# Patient Record
Sex: MALE | Race: BLACK OR AFRICAN AMERICAN | NOT HISPANIC OR LATINO | Employment: FULL TIME | URBAN - METROPOLITAN AREA
[De-identification: names, ages, dates, MRNs, and addresses within clinical notes are randomized per-mention and may not be internally consistent; named-entity substitution may affect disease eponyms.]

---

## 2022-08-14 ENCOUNTER — APPOINTMENT (EMERGENCY)
Dept: RADIOLOGY | Facility: HOSPITAL | Age: 37
End: 2022-08-14
Payer: COMMERCIAL

## 2022-08-14 ENCOUNTER — HOSPITAL ENCOUNTER (EMERGENCY)
Facility: HOSPITAL | Age: 37
Discharge: HOME | End: 2022-08-14
Attending: EMERGENCY MEDICINE
Payer: COMMERCIAL

## 2022-08-14 VITALS
BODY MASS INDEX: 36.19 KG/M2 | OXYGEN SATURATION: 100 % | RESPIRATION RATE: 16 BRPM | TEMPERATURE: 98.1 F | HEART RATE: 85 BPM | WEIGHT: 282 LBS | SYSTOLIC BLOOD PRESSURE: 144 MMHG | HEIGHT: 74 IN | DIASTOLIC BLOOD PRESSURE: 99 MMHG

## 2022-08-14 DIAGNOSIS — M54.50 ACUTE LOW BACK PAIN: Primary | ICD-10-CM

## 2022-08-14 PROCEDURE — 99284 EMERGENCY DEPT VISIT MOD MDM: CPT | Performed by: EMERGENCY MEDICINE

## 2022-08-14 PROCEDURE — 72100 X-RAY EXAM L-S SPINE 2/3 VWS: CPT

## 2022-08-14 PROCEDURE — 99283 EMERGENCY DEPT VISIT LOW MDM: CPT

## 2022-08-14 RX ORDER — LIDOCAINE 50 MG/G
1 PATCH TOPICAL DAILY
Qty: 30 PATCH | Refills: 0 | Status: SHIPPED | OUTPATIENT
Start: 2022-08-14

## 2022-08-14 RX ORDER — DICLOFENAC SODIUM 75 MG/1
75 TABLET, DELAYED RELEASE ORAL 2 TIMES DAILY
Qty: 30 TABLET | Refills: 0 | Status: SHIPPED | OUTPATIENT
Start: 2022-08-14

## 2022-08-14 RX ORDER — ATORVASTATIN CALCIUM 10 MG/1
10 TABLET, FILM COATED ORAL DAILY
COMMUNITY

## 2022-08-14 RX ORDER — NAPROXEN 250 MG/1
500 TABLET ORAL ONCE
Status: COMPLETED | OUTPATIENT
Start: 2022-08-14 | End: 2022-08-14

## 2022-08-14 RX ORDER — LIDOCAINE 50 MG/G
1 PATCH TOPICAL ONCE
Status: DISCONTINUED | OUTPATIENT
Start: 2022-08-14 | End: 2022-08-14 | Stop reason: HOSPADM

## 2022-08-14 RX ORDER — LISINOPRIL 20 MG/1
20 TABLET ORAL DAILY
COMMUNITY

## 2022-08-14 RX ORDER — CYCLOBENZAPRINE HCL 10 MG
10 TABLET ORAL 2 TIMES DAILY PRN
Qty: 20 TABLET | Refills: 0 | Status: SHIPPED | OUTPATIENT
Start: 2022-08-14

## 2022-08-14 RX ORDER — ACETAMINOPHEN 325 MG/1
650 TABLET ORAL ONCE
Status: COMPLETED | OUTPATIENT
Start: 2022-08-14 | End: 2022-08-14

## 2022-08-14 RX ADMIN — LIDOCAINE 5% 1 PATCH: 700 PATCH TOPICAL at 07:18

## 2022-08-14 RX ADMIN — NAPROXEN 500 MG: 250 TABLET ORAL at 07:18

## 2022-08-14 RX ADMIN — ACETAMINOPHEN 650 MG: 325 TABLET ORAL at 07:18

## 2022-08-14 NOTE — ED PROVIDER NOTES
History  Chief Complaint   Patient presents with    Back Pain     Patient states he was at the store when his lower back began to hurt, unsure of what caused the pain to start but has lasted for 3 days now  HPI  29-year-old male presents with right low back pain that started 3 days ago when he was lifting boxes at a store  States that pain is worse with movement and better with rest   It is moderate and aching  He took Aleve with some improvement  No weakness, numbness, saddle anesthesia, fevers, IV drug use  Prior to Admission Medications   Prescriptions Last Dose Informant Patient Reported? Taking?   atorvastatin (LIPITOR) 10 mg tablet   Yes Yes   Sig: Take 10 mg by mouth daily   lisinopril (ZESTRIL) 20 mg tablet   Yes Yes   Sig: Take 20 mg by mouth daily   metFORMIN (GLUCOPHAGE) 1000 MG tablet   Yes Yes   Sig: Take 1,000 mg by mouth      Facility-Administered Medications: None       Past Medical History:   Diagnosis Date    [facility]        History reviewed  No pertinent surgical history  History reviewed  No pertinent family history  I have reviewed and agree with the history as documented  E-Cigarette/Vaping     E-Cigarette/Vaping Substances     Social History     Tobacco Use    Smoking status: Current Every Day Smoker     Packs/day: 0 25     Types: Cigarettes   Substance Use Topics    Drug use: Yes     Types: Marijuana       Review of Systems   Constitutional: Negative for chills and fever  HENT: Negative for dental problem and ear pain  Eyes: Negative for pain and redness  Respiratory: Negative for cough and shortness of breath  Cardiovascular: Negative for chest pain and palpitations  Gastrointestinal: Negative for abdominal pain and nausea  Endocrine: Negative for polydipsia and polyphagia  Genitourinary: Negative for dysuria and frequency  Musculoskeletal: Positive for back pain  Negative for arthralgias and joint swelling     Skin: Negative for color change and rash  Neurological: Negative for dizziness and headaches  Psychiatric/Behavioral: Negative for behavioral problems and confusion  All other systems reviewed and are negative  Physical Exam  Physical Exam  Vitals and nursing note reviewed  Constitutional:       General: He is not in acute distress  Appearance: He is well-developed  He is not diaphoretic  HENT:      Head: Atraumatic  Right Ear: External ear normal       Left Ear: External ear normal       Nose: Nose normal    Eyes:      Conjunctiva/sclera: Conjunctivae normal       Pupils: Pupils are equal, round, and reactive to light  Neck:      Vascular: No JVD  Cardiovascular:      Rate and Rhythm: Normal rate and regular rhythm  Heart sounds: Normal heart sounds  No murmur heard  Pulmonary:      Effort: Pulmonary effort is normal  No respiratory distress  Breath sounds: Normal breath sounds  No wheezing  Abdominal:      General: Bowel sounds are normal  There is no distension  Palpations: Abdomen is soft  Tenderness: There is no abdominal tenderness  Musculoskeletal:         General: Normal range of motion  Cervical back: Normal range of motion and neck supple  Comments: Right lumbar paraspinal tenderness to palpation, normal strength and sensation bilateral lower extremities, normal gait   Skin:     General: Skin is warm and dry  Capillary Refill: Capillary refill takes less than 2 seconds  Neurological:      Mental Status: He is alert and oriented to person, place, and time  Cranial Nerves: No cranial nerve deficit     Psychiatric:         Behavior: Behavior normal          Vital Signs  ED Triage Vitals [08/14/22 0703]   Temperature Pulse Respirations Blood Pressure SpO2   98 1 °F (36 7 °C) 85 16 144/99 98 %      Temp Source Heart Rate Source Patient Position - Orthostatic VS BP Location FiO2 (%)   Oral Monitor Sitting Right arm --      Pain Score       8           Vitals: 08/14/22 0703   BP: 144/99   Pulse: 85   Patient Position - Orthostatic VS: Sitting         Visual Acuity      ED Medications  Medications   lidocaine (LIDODERM) 5 % patch 1 patch (1 patch Topical Medication Applied 8/14/22 0718)   naproxen (NAPROSYN) tablet 500 mg (500 mg Oral Given 8/14/22 0718)   acetaminophen (TYLENOL) tablet 650 mg (650 mg Oral Given 8/14/22 1782)       Diagnostic Studies  Results Reviewed     None                 XR spine lumbar 2 or 3 views injury    (Results Pending)              Procedures  Procedures         ED Course                               SBIRT 20yo+    Flowsheet Row Most Recent Value   SBIRT (23 yo +)    In order to provide better care to our patients, we are screening all of our patients for alcohol and drug use  Would it be okay to ask you these screening questions? Yes Filed at: 08/14/2022 1348   Initial Alcohol Screen: US AUDIT-C     1  How often do you have a drink containing alcohol? 0 Filed at: 08/14/2022 0708   2  How many drinks containing alcohol do you have on a typical day you are drinking? 0 Filed at: 08/14/2022 0708   3a  Male UNDER 65: How often do you have five or more drinks on one occasion? 0 Filed at: 08/14/2022 0708   Audit-C Score 0 Filed at: 08/14/2022 8827   KALEN: How many times in the past year have you    Used an illegal drug or used a prescription medication for non-medical reasons? Never Filed at: 08/14/2022 0708                    MDM  29 yo M presents with low back pain  No red flags in history or exam to suggest cauda equina  XR no acute abnormality per my read  Discussed supportive care and referral to comprehensive spine    Disposition  Final diagnoses:   Acute low back pain     Time reflects when diagnosis was documented in both MDM as applicable and the Disposition within this note     Time User Action Codes Description Comment    8/14/2022  7:37 AM Rosalinda Habermann Add [C17 50] Acute low back pain       ED Disposition     ED Disposition Discharge    Condition   Stable    Date/Time   Sun Aug 14, 2022  7:37 AM    Comment   Meenu Payne  discharge to home/self care                 Follow-up Information    None         Discharge Medication List as of 8/14/2022  7:40 AM      START taking these medications    Details   cyclobenzaprine (FLEXERIL) 10 mg tablet Take 1 tablet (10 mg total) by mouth 2 (two) times a day as needed for muscle spasms, Starting Sun 8/14/2022, Normal      diclofenac (VOLTAREN) 75 mg EC tablet Take 1 tablet (75 mg total) by mouth 2 (two) times a day, Starting Sun 8/14/2022, Normal      lidocaine (LIDODERM) 5 % Apply 1 patch topically daily Remove & Discard patch within 12 hours or as directed by MD, Starting Sun 8/14/2022, Normal         CONTINUE these medications which have NOT CHANGED    Details   atorvastatin (LIPITOR) 10 mg tablet Take 10 mg by mouth daily, Historical Med      lisinopril (ZESTRIL) 20 mg tablet Take 20 mg by mouth daily, Historical Med      metFORMIN (GLUCOPHAGE) 1000 MG tablet Take 1,000 mg by mouth, Historical Med                 PDMP Review     None          ED Provider  Electronically Signed by           Keshawn Villalta MD  08/14/22 0146

## 2022-08-14 NOTE — Clinical Note
Dioni Beal was seen and treated in our emergency department on 8/14/2022  Diagnosis: back pain    Monica Axon  may return to work on return date  He may return on this date: 08/17/2022         If you have any questions or concerns, please don't hesitate to call        Alba Velázquez MD    ______________________________           _______________          _______________  Hospital Representative                              Date                                Time

## 2022-08-14 NOTE — DISCHARGE INSTRUCTIONS
1  Rx of Voltaren, for the stronger pains  2  Rx of Flexeril, for pain and spasms  3  Rest your back for a day or two to begin healing  Use a firm mattress or the floor  Have your lower back firmly supported with a small pillow or towel  Keep your knees slightly bent, with another pillow under them  Every few hours, get up and walk as much as you can  4  Cold reduces swelling  Both cold and heat can reduce pain  Protect your skin by placing a towel between your body and the ice or heat source  For the first few days, apply an ice pack for 10-15 minutes every hour while youre awake  After the first few days, try heat to ease pain  5  Over-the-counter medications can help control pain and swelling, such as Tylenol     6  Exercise can help your back heal  It also helps your back get stronger and more flexible, preventing any reinjury  Also, use Good Posture to Avoid Re-injury  When moving, bend at the hips and knees  Dont bend at the waist or twist around  When lifting, keep the object close to your body  Dont try to lift more than you can handle  When sitting, keep your lower back supported  Use a rolled-up towel as needed  7  If you smoke, STOP! Smokers have diminished oxygen levels in their spinal tissues, which can hinder the healing process  8  Please make an appointment with your family doctor in one week for follow up if not starting to improve

## 2022-08-15 ENCOUNTER — TELEPHONE (OUTPATIENT)
Dept: PHYSICAL THERAPY | Facility: OTHER | Age: 37
End: 2022-08-15

## 2022-11-06 ENCOUNTER — HOSPITAL ENCOUNTER (EMERGENCY)
Facility: HOSPITAL | Age: 37
Discharge: HOME | End: 2022-11-06
Attending: EMERGENCY MEDICINE

## 2022-11-06 VITALS
RESPIRATION RATE: 17 BRPM | DIASTOLIC BLOOD PRESSURE: 95 MMHG | OXYGEN SATURATION: 99 % | HEART RATE: 66 BPM | BODY MASS INDEX: 33.62 KG/M2 | TEMPERATURE: 97.8 F | WEIGHT: 262 LBS | SYSTOLIC BLOOD PRESSURE: 146 MMHG | HEIGHT: 74 IN

## 2022-11-06 DIAGNOSIS — S09.90XA INJURY OF HEAD, INITIAL ENCOUNTER: Primary | ICD-10-CM
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
## 2022-11-06 NOTE — Clinical Note
Maria M Wendichris was seen and treated in our emergency department on 11/6/2022  Diagnosis:     Radha Menchaca  may return to work on return date  He may return on this date: 11/08/2022         If you have any questions or concerns, please don't hesitate to call        Babar Hernández MD    ______________________________           _______________          _______________  Hospital Representative                              Date                                Time

## 2022-11-06 NOTE — DISCHARGE INSTRUCTIONS
Normal care  Tylenol if needed for pain  Return increasing pain worsening symptoms, vomiting, confusion difficulty walking or any problems

## 2023-04-29 ENCOUNTER — APPOINTMENT (EMERGENCY)
Dept: RADIOLOGY | Facility: HOSPITAL | Age: 38
End: 2023-04-29

## 2023-04-29 ENCOUNTER — HOSPITAL ENCOUNTER (EMERGENCY)
Facility: HOSPITAL | Age: 38
Discharge: HOME | End: 2023-04-29
Attending: EMERGENCY MEDICINE | Admitting: EMERGENCY MEDICINE

## 2023-04-29 VITALS
OXYGEN SATURATION: 99 % | DIASTOLIC BLOOD PRESSURE: 90 MMHG | HEART RATE: 84 BPM | SYSTOLIC BLOOD PRESSURE: 148 MMHG | TEMPERATURE: 98.3 F | RESPIRATION RATE: 17 BRPM

## 2023-04-29 DIAGNOSIS — G89.29 CHRONIC RIGHT-SIDED LOW BACK PAIN WITH RIGHT-SIDED SCIATICA: Primary | ICD-10-CM

## 2023-04-29 DIAGNOSIS — R42 LIGHTHEADEDNESS: ICD-10-CM

## 2023-04-29 DIAGNOSIS — M54.41 CHRONIC RIGHT-SIDED LOW BACK PAIN WITH RIGHT-SIDED SCIATICA: Primary | ICD-10-CM

## 2023-04-29 LAB
ALBUMIN SERPL BCP-MCNC: 4.6 G/DL (ref 3.5–5)
ALP SERPL-CCNC: 67 U/L (ref 34–104)
ALT SERPL W P-5'-P-CCNC: 37 U/L (ref 7–52)
ANION GAP SERPL CALCULATED.3IONS-SCNC: 6 MMOL/L (ref 4–13)
AST SERPL W P-5'-P-CCNC: 32 U/L (ref 13–39)
ATRIAL RATE: 86 BPM
BASOPHILS # BLD AUTO: 0.03 THOUSANDS/ΜL (ref 0–0.1)
BASOPHILS NFR BLD AUTO: 0 % (ref 0–1)
BILIRUB SERPL-MCNC: 0.44 MG/DL (ref 0.2–1)
BUN SERPL-MCNC: 13 MG/DL (ref 5–25)
CALCIUM SERPL-MCNC: 9.4 MG/DL (ref 8.4–10.2)
CARDIAC TROPONIN I PNL SERPL HS: 3 NG/L
CHLORIDE SERPL-SCNC: 104 MMOL/L (ref 96–108)
CO2 SERPL-SCNC: 24 MMOL/L (ref 21–32)
CREAT SERPL-MCNC: 0.92 MG/DL (ref 0.6–1.3)
D DIMER PPP FEU-MCNC: 0.41 UG/ML FEU
EOSINOPHIL # BLD AUTO: 0.34 THOUSAND/ΜL (ref 0–0.61)
EOSINOPHIL NFR BLD AUTO: 5 % (ref 0–6)
ERYTHROCYTE [DISTWIDTH] IN BLOOD BY AUTOMATED COUNT: 12.8 % (ref 11.6–15.1)
GFR SERPL CREATININE-BSD FRML MDRD: 106 ML/MIN/1.73SQ M
GLUCOSE SERPL-MCNC: 207 MG/DL (ref 65–140)
HCT VFR BLD AUTO: 49 % (ref 36.5–49.3)
HGB BLD-MCNC: 16.7 G/DL (ref 12–17)
IMM GRANULOCYTES # BLD AUTO: 0.02 THOUSAND/UL (ref 0–0.2)
IMM GRANULOCYTES NFR BLD AUTO: 0 % (ref 0–2)
LYMPHOCYTES # BLD AUTO: 2.9 THOUSANDS/ΜL (ref 0.6–4.47)
LYMPHOCYTES NFR BLD AUTO: 40 % (ref 14–44)
MCH RBC QN AUTO: 29.8 PG (ref 26.8–34.3)
MCHC RBC AUTO-ENTMCNC: 34.1 G/DL (ref 31.4–37.4)
MCV RBC AUTO: 88 FL (ref 82–98)
MONOCYTES # BLD AUTO: 0.39 THOUSAND/ΜL (ref 0.17–1.22)
MONOCYTES NFR BLD AUTO: 5 % (ref 4–12)
NEUTROPHILS # BLD AUTO: 3.49 THOUSANDS/ΜL (ref 1.85–7.62)
NEUTS SEG NFR BLD AUTO: 50 % (ref 43–75)
NRBC BLD AUTO-RTO: 0 /100 WBCS
P AXIS: 48 DEGREES
PLATELET # BLD AUTO: 237 THOUSANDS/UL (ref 149–390)
PMV BLD AUTO: 10.1 FL (ref 8.9–12.7)
POTASSIUM SERPL-SCNC: 5 MMOL/L (ref 3.5–5.3)
PR INTERVAL: 132 MS
PROT SERPL-MCNC: 8.2 G/DL (ref 6.4–8.4)
QRS AXIS: 19 DEGREES
QRSD INTERVAL: 82 MS
QT INTERVAL: 346 MS
QTC INTERVAL: 414 MS
RBC # BLD AUTO: 5.6 MILLION/UL (ref 3.88–5.62)
SODIUM SERPL-SCNC: 134 MMOL/L (ref 135–147)
T WAVE AXIS: 23 DEGREES
VENTRICULAR RATE: 86 BPM
WBC # BLD AUTO: 7.17 THOUSAND/UL (ref 4.31–10.16)

## 2023-04-29 RX ORDER — KETOROLAC TROMETHAMINE 30 MG/ML
15 INJECTION, SOLUTION INTRAMUSCULAR; INTRAVENOUS ONCE
Status: DISCONTINUED | OUTPATIENT
Start: 2023-04-29 | End: 2023-04-29

## 2023-04-29 RX ORDER — LIDOCAINE 50 MG/G
1 PATCH TOPICAL ONCE
Status: DISCONTINUED | OUTPATIENT
Start: 2023-04-29 | End: 2023-04-29 | Stop reason: HOSPADM

## 2023-04-29 RX ORDER — METHOCARBAMOL 750 MG/1
750 TABLET, FILM COATED ORAL 2 TIMES DAILY PRN
Qty: 14 TABLET | Refills: 0 | Status: SHIPPED | OUTPATIENT
Start: 2023-04-29

## 2023-04-29 RX ORDER — ACETAMINOPHEN 325 MG/1
975 TABLET ORAL ONCE
Status: COMPLETED | OUTPATIENT
Start: 2023-04-29 | End: 2023-04-29

## 2023-04-29 RX ORDER — KETOROLAC TROMETHAMINE 30 MG/ML
15 INJECTION, SOLUTION INTRAMUSCULAR; INTRAVENOUS ONCE
Status: COMPLETED | OUTPATIENT
Start: 2023-04-29 | End: 2023-04-29

## 2023-04-29 RX ADMIN — KETOROLAC TROMETHAMINE 15 MG: 30 INJECTION, SOLUTION INTRAMUSCULAR at 15:07

## 2023-04-29 RX ADMIN — ACETAMINOPHEN 975 MG: 325 TABLET ORAL at 15:03

## 2023-04-29 NOTE — Clinical Note
Jessica Roche was seen and treated in our emergency department on 4/29/2023  Diagnosis:     Rosa Washburn  may return to work on return date  He may return on this date: 04/30/2023         If you have any questions or concerns, please don't hesitate to call        Doris Rios DO    ______________________________           _______________          _______________  Hospital Representative                              Date                                Time

## 2023-04-29 NOTE — DISCHARGE INSTRUCTIONS
Do not drive or operate heavy machinery while taking muscle relaxers  Drink plenty of fluids to stay hydrated  Return to the emergency department for any new or worsening symptoms

## 2023-05-01 ENCOUNTER — TELEPHONE (OUTPATIENT)
Dept: PHYSICAL THERAPY | Facility: OTHER | Age: 38
End: 2023-05-01

## 2023-05-01 NOTE — TELEPHONE ENCOUNTER
Call placed to the patient per [facility] referral     Patient states he has a referral to Dr Steve Moarles with Sports Medicine and would like to see him before considering our program     Referral Closed

## 2023-05-16 ENCOUNTER — HOSPITAL ENCOUNTER (EMERGENCY)
Facility: HOSPITAL | Age: 38
Discharge: HOME | End: 2023-05-16
Attending: EMERGENCY MEDICINE

## 2023-05-16 VITALS
OXYGEN SATURATION: 98 % | SYSTOLIC BLOOD PRESSURE: 117 MMHG | DIASTOLIC BLOOD PRESSURE: 71 MMHG | TEMPERATURE: 98.2 F | HEART RATE: 64 BPM | RESPIRATION RATE: 18 BRPM

## 2023-05-16 DIAGNOSIS — R19.7 NAUSEA VOMITING AND DIARRHEA: Primary | ICD-10-CM

## 2023-05-16 DIAGNOSIS — R11.2 NAUSEA VOMITING AND DIARRHEA: Primary | ICD-10-CM

## 2023-05-16 DIAGNOSIS — R10.13 EPIGASTRIC ABDOMINAL PAIN: ICD-10-CM

## 2023-05-16 LAB
ALBUMIN SERPL BCP-MCNC: 4.6 G/DL (ref 3.5–5)
ALP SERPL-CCNC: 66 U/L (ref 34–104)
ALT SERPL W P-5'-P-CCNC: 44 U/L (ref 7–52)
ANION GAP SERPL CALCULATED.3IONS-SCNC: 6 MMOL/L (ref 4–13)
AST SERPL W P-5'-P-CCNC: 28 U/L (ref 13–39)
BASOPHILS # BLD AUTO: 0.03 THOUSANDS/ÂΜL (ref 0–0.1)
BASOPHILS NFR BLD AUTO: 1 % (ref 0–1)
BILIRUB SERPL-MCNC: 0.46 MG/DL (ref 0.2–1)
BILIRUB UR QL STRIP: NEGATIVE
BUN SERPL-MCNC: 7 MG/DL (ref 5–25)
CALCIUM SERPL-MCNC: 9.5 MG/DL (ref 8.4–10.2)
CHLORIDE SERPL-SCNC: 103 MMOL/L (ref 96–108)
CLARITY UR: CLEAR
CO2 SERPL-SCNC: 27 MMOL/L (ref 21–32)
COLOR UR: NORMAL
CREAT SERPL-MCNC: 0.88 MG/DL (ref 0.6–1.3)
EOSINOPHIL # BLD AUTO: 0.42 THOUSAND/ÂΜL (ref 0–0.61)
EOSINOPHIL NFR BLD AUTO: 6 % (ref 0–6)
ERYTHROCYTE [DISTWIDTH] IN BLOOD BY AUTOMATED COUNT: 12.7 % (ref 11.6–15.1)
GFR SERPL CREATININE-BSD FRML MDRD: 110 ML/MIN/1.73SQ M
GLUCOSE SERPL-MCNC: 165 MG/DL (ref 65–140)
GLUCOSE UR STRIP-MCNC: NEGATIVE MG/DL
HCT VFR BLD AUTO: 49.2 % (ref 36.5–49.3)
HGB BLD-MCNC: 16.8 G/DL (ref 12–17)
HGB UR QL STRIP.AUTO: NEGATIVE
IMM GRANULOCYTES # BLD AUTO: 0.01 THOUSAND/UL (ref 0–0.2)
IMM GRANULOCYTES NFR BLD AUTO: 0 % (ref 0–2)
KETONES UR STRIP-MCNC: NEGATIVE MG/DL
LEUKOCYTE ESTERASE UR QL STRIP: NEGATIVE
LIPASE SERPL-CCNC: 45 U/L (ref 11–82)
LYMPHOCYTES # BLD AUTO: 3.02 THOUSANDS/ÂΜL (ref 0.6–4.47)
LYMPHOCYTES NFR BLD AUTO: 46 % (ref 14–44)
MCH RBC QN AUTO: 29.8 PG (ref 26.8–34.3)
MCHC RBC AUTO-ENTMCNC: 34.1 G/DL (ref 31.4–37.4)
MCV RBC AUTO: 87 FL (ref 82–98)
MONOCYTES # BLD AUTO: 0.4 THOUSAND/ÂΜL (ref 0.17–1.22)
MONOCYTES NFR BLD AUTO: 6 % (ref 4–12)
NEUTROPHILS # BLD AUTO: 2.66 THOUSANDS/ÂΜL (ref 1.85–7.62)
NEUTS SEG NFR BLD AUTO: 41 % (ref 43–75)
NITRITE UR QL STRIP: NEGATIVE
NRBC BLD AUTO-RTO: 0 /100 WBCS
PH UR STRIP.AUTO: 6 [PH]
PLATELET # BLD AUTO: 293 THOUSANDS/UL (ref 149–390)
PMV BLD AUTO: 9.8 FL (ref 8.9–12.7)
POTASSIUM SERPL-SCNC: 4 MMOL/L (ref 3.5–5.3)
PROT SERPL-MCNC: 8.2 G/DL (ref 6.4–8.4)
PROT UR STRIP-MCNC: NEGATIVE MG/DL
RBC # BLD AUTO: 5.64 MILLION/UL (ref 3.88–5.62)
SODIUM SERPL-SCNC: 136 MMOL/L (ref 135–147)
SP GR UR STRIP.AUTO: 1.01 (ref 1–1.03)
UROBILINOGEN UR STRIP-ACNC: <2 MG/DL
WBC # BLD AUTO: 6.54 THOUSAND/UL (ref 4.31–10.16)

## 2023-05-16 RX ORDER — ONDANSETRON 4 MG/1
4 TABLET, ORALLY DISINTEGRATING ORAL EVERY 6 HOURS PRN
Qty: 20 TABLET | Refills: 0 | Status: SHIPPED | OUTPATIENT
Start: 2023-05-16

## 2023-05-16 RX ORDER — ONDANSETRON 2 MG/ML
4 INJECTION INTRAMUSCULAR; INTRAVENOUS ONCE
Status: COMPLETED | OUTPATIENT
Start: 2023-05-16 | End: 2023-05-16

## 2023-05-16 RX ORDER — DICYCLOMINE HCL 20 MG
20 TABLET ORAL ONCE
Status: COMPLETED | OUTPATIENT
Start: 2023-05-16 | End: 2023-05-16

## 2023-05-16 RX ORDER — DICYCLOMINE HCL 20 MG
20 TABLET ORAL 2 TIMES DAILY
Qty: 20 TABLET | Refills: 0 | Status: SHIPPED | OUTPATIENT
Start: 2023-05-16

## 2023-05-16 RX ADMIN — SODIUM CHLORIDE 1000 ML: 0.9 INJECTION, SOLUTION INTRAVENOUS at 11:03

## 2023-05-16 RX ADMIN — ONDANSETRON 4 MG: 2 INJECTION INTRAMUSCULAR; INTRAVENOUS at 11:04

## 2023-05-16 RX ADMIN — DICYCLOMINE HYDROCHLORIDE 20 MG: 20 TABLET ORAL at 11:04

## 2023-05-16 NOTE — Clinical Note
Steve Harrell was seen and treated in our emergency department on 5/16/2023  No restrictions            Diagnosis:     Tawnya Stewart  may return to work on return date  He may return on this date: 05/18/2023         If you have any questions or concerns, please don't hesitate to call        Violet Powell RN    ______________________________           _______________          _______________  Hospital Representative                              Date                                Time

## 2023-05-16 NOTE — ED PROVIDER NOTES
History  Chief Complaint   Patient presents with   • Vomiting     Abd pain and vomiting since Friday, reported weakness and foul smelling belching     42 y/o male presents to the ED for n/v/d and abd pain x 5 days  Patient states that he has had intermittent nausea, vomiting, and diarrhea  States that he also had intermittent crampy upper abd pain, which is currently resolved  He states that overall he is feeling improved  However, today he belched and he was foul smelling so he was concerned and came in  He denies any fever, cp, sob, or urinary symptoms  He has not tried anything for the symptoms  Family had similar symptoms recently  No other complaints  History provided by:  Patient  Abdominal Pain  Pain location:  Epigastric  Pain quality: cramping    Pain radiates to:  Does not radiate  Pain severity:  Mild  Onset quality:  Sudden  Timing:  Intermittent  Progression:  Resolved  Chronicity:  New  Context: not previous surgeries and not sick contacts    Relieved by:  None tried  Worsened by:  Nothing  Ineffective treatments:  None tried  Associated symptoms: diarrhea, nausea and vomiting    Associated symptoms: no chest pain, no chills, no cough, no dysuria, no fever, no hematuria, no shortness of breath and no sore throat        Prior to Admission Medications   Prescriptions Last Dose Informant Patient Reported?  Taking?   atorvastatin (LIPITOR) 10 mg tablet   Yes No   Sig: Take 10 mg by mouth daily   diclofenac (VOLTAREN) 75 mg EC tablet   No No   Sig: Take 1 tablet (75 mg total) by mouth 2 (two) times a day   lidocaine (LIDODERM) 5 %   No No   Sig: Apply 1 patch topically daily Remove & Discard patch within 12 hours or as directed by MD   lisinopril (ZESTRIL) 20 mg tablet   Yes No   Sig: Take 20 mg by mouth daily   metFORMIN (GLUCOPHAGE) 1000 MG tablet   Yes No   Sig: Take 1,000 mg by mouth   methocarbamol (ROBAXIN) 750 mg tablet   No No   Sig: Take 1 tablet (750 mg total) by mouth 2 (two) times a day as needed for muscle spasms      Facility-Administered Medications: None       Past Medical History:   Diagnosis Date   • Diabetes mellitus ([facility] [address] )        No past surgical history on file  No family history on file  I have reviewed and agree with the history as documented  E-Cigarette/Vaping     E-Cigarette/Vaping Substances     Social History     Tobacco Use   • Smoking status: Every Day     Packs/day: 0 25     Types: Cigarettes   • Smokeless tobacco: Never   Substance Use Topics   • Drug use: Yes     Types: Marijuana       Review of Systems   Constitutional: Negative for chills and fever  HENT: Negative for congestion, ear pain and sore throat  Eyes: Negative for pain and visual disturbance  Respiratory: Negative for cough, shortness of breath and wheezing  Cardiovascular: Negative for chest pain and leg swelling  Gastrointestinal: Positive for abdominal pain, diarrhea, nausea and vomiting  Genitourinary: Negative for dysuria, frequency, hematuria and urgency  Musculoskeletal: Negative for neck pain and neck stiffness  Skin: Negative for rash and wound  Neurological: Negative for weakness, numbness and headaches  Psychiatric/Behavioral: Negative for agitation and confusion  All other systems reviewed and are negative  Physical Exam  Physical Exam  Vitals and nursing note reviewed  Constitutional:       Appearance: He is well-developed  HENT:      Head: Normocephalic and atraumatic  Eyes:      Pupils: Pupils are equal, round, and reactive to light  Cardiovascular:      Rate and Rhythm: Normal rate and regular rhythm  Pulmonary:      Effort: Pulmonary effort is normal       Breath sounds: Normal breath sounds  Abdominal:      General: Bowel sounds are normal       Palpations: Abdomen is soft  Tenderness: There is no abdominal tenderness  Musculoskeletal:         General: Normal range of motion  Cervical back: Normal range of motion and neck supple     Skin: General: Skin is warm and dry  Neurological:      General: No focal deficit present  Mental Status: He is alert and oriented to person, place, and time        Comments: No focal deficits         Vital Signs  ED Triage Vitals [05/16/23 1001]   Temperature Pulse Respirations Blood Pressure SpO2   98 2 °F (36 8 °C) 93 18 162/96 100 %      Temp Source Heart Rate Source Patient Position - Orthostatic VS BP Location FiO2 (%)   Temporal Monitor Sitting Left arm --      Pain Score       5           Vitals:    05/16/23 1001 05/16/23 1213   BP: 162/96 117/71   Pulse: 93 64   Patient Position - Orthostatic VS: Sitting Lying         Visual Acuity      ED Medications  Medications   sodium chloride 0 9 % bolus 1,000 mL (1,000 mL Intravenous New Bag 5/16/23 1103)   ondansetron (ZOFRAN) injection 4 mg (4 mg Intravenous Given 5/16/23 1104)   dicyclomine (BENTYL) tablet 20 mg (20 mg Oral Given 5/16/23 1104)       Diagnostic Studies  Results Reviewed     Procedure Component Value Units Date/Time    UA w Reflex to Microscopic w Reflex to Culture [[id number]] Collected: 05/16/23 1224    Lab Status: Final result Specimen: Urine, Other Updated: 05/16/23 1243     Color, UA Light Yellow     Clarity, UA Clear     Specific Gravity, UA 1 011     pH, UA 6 0     Leukocytes, UA Negative     Nitrite, UA Negative     Protein, UA Negative mg/dl      Glucose, UA Negative mg/dl      Ketones, UA Negative mg/dl      Urobilinogen, UA <2 0 mg/dl      Bilirubin, UA Negative     Occult Blood, UA Negative    Lipase [[id number]]  (Normal) Collected: 05/16/23 1053    Lab Status: Final result Specimen: Blood from Arm, Left Updated: 05/16/23 1137     Lipase 45 u/L     CMP [[id number]]  (Abnormal) Collected: 05/16/23 1053    Lab Status: Final result Specimen: Blood from Arm, Left Updated: 05/16/23 1137     Sodium 136 mmol/L      Potassium 4 0 mmol/L      Chloride 103 mmol/L      CO2 27 mmol/L      ANION GAP 6 mmol/L      BUN 7 mg/dL      Creatinine 0 88 mg/dL Glucose 165 mg/dL      Calcium 9 5 mg/dL      AST 28 U/L      ALT 44 U/L      Alkaline Phosphatase 66 U/L      Total Protein 8 2 g/dL      Albumin 4 6 g/dL      Total Bilirubin 0 46 mg/dL      eGFR 110 ml/min/1 73sq m     Narrative:      [place] guidelines for Chronic Kidney Disease (CKD):   •  Stage 1 with normal or high GFR (GFR > 90 mL/min/1 73 square meters)  •  Stage 2 Mild CKD (GFR = 60-89 mL/min/1 73 square meters)  •  Stage 3A Moderate CKD (GFR = 45-59 mL/min/1 73 square meters)  •  Stage 3B Moderate CKD (GFR = 30-44 mL/min/1 73 square meters)  •  Stage 4 Severe CKD (GFR = 15-29 mL/min/1 73 square meters)  •  Stage 5 End Stage CKD (GFR <15 mL/min/1 73 square meters)  Note: GFR calculation is accurate only with a steady state creatinine    CBC and differential [[id number]]  (Abnormal) Collected: 05/16/23 1053    Lab Status: Final result Specimen: Blood from Arm, Left Updated: 05/16/23 1112     WBC 6 54 Thousand/uL      RBC 5 64 Million/uL      Hemoglobin 16 8 g/dL      Hematocrit 49 2 %      MCV 87 fL      MCH 29 8 pg      MCHC 34 1 g/dL      RDW 12 7 %      MPV 9 8 fL      Platelets 184 Thousands/uL      nRBC 0 /100 WBCs      Neutrophils Relative 41 %      Immat GRANS % 0 %      Lymphocytes Relative 46 %      Monocytes Relative 6 %      Eosinophils Relative 6 %      Basophils Relative 1 %      Neutrophils Absolute 2 66 Thousands/µL      Immature Grans Absolute 0 01 Thousand/uL      Lymphocytes Absolute 3 02 Thousands/µL      Monocytes Absolute 0 40 Thousand/µL      Eosinophils Absolute 0 42 Thousand/µL      Basophils Absolute 0 03 Thousands/µL                  No orders to display              Procedures  Procedures         ED Course                               SBIRT 22yo+    Flowsheet Row Most Recent Value   Initial Alcohol Screen: US AUDIT-C     1  How often do you have a drink containing alcohol? 0 Filed at: 05/16/2023 1002   2   How many drinks containing alcohol do you have on a typical day you are drinking? 0 Filed at: 05/16/2023 1002   3a  Male UNDER 65: How often do you have five or more drinks on one occasion? 0 Filed at: 05/16/2023 1002   3b  FEMALE Any Age, or MALE 65+: How often do you have 4 or more drinks on one occassion? 0 Filed at: 05/16/2023 1002   Audit-C Score 0 Filed at: 05/16/2023 1002   KALEN: How many times in the past year have you    Used an illegal drug or used a prescription medication for non-medical reasons? Never Filed at: 05/16/2023 1002                    Medical Decision Making  Patient with n/v/d and abd pain that is resolved  Will get labs and give bentyl, zofran, fluids, and reassess  Labs are unremarkable and patient feels improved  Will d/c      Epigastric abdominal pain: acute illness or injury  Nausea vomiting and diarrhea: acute illness or injury  Amount and/or Complexity of Data Reviewed  Labs: ordered  Risk  Prescription drug management  Disposition  Final diagnoses:   Nausea vomiting and diarrhea   Epigastric abdominal pain     Time reflects when diagnosis was documented in both MDM as applicable and the Disposition within this note     Time User Action Codes Description Comment    5/16/2023 12:56 PM Reford Bucy A Add [R11 2,  R19 7] Nausea vomiting and diarrhea     5/16/2023 12:57 PM Reford Bucy A Add [R10 13] Epigastric abdominal pain       ED Disposition     ED Disposition   Discharge    Condition   Stable    Date/Time   Tue May 16, 2023 12:56 PM    Comment   Cyndie Alpers  discharge to home/self care                 Follow-up Information     Follow up With Specialties Details Why Contact Info Additional Information    Atha Pencil, DO Family Medicine Call in 1 day for follow up within 2-3 days [address] [facility] Emergency Department Emergency Medicine Go to  immediately for any new or worsening symptoms [address]  [facility] [address] [facility] [id number] [facility] Emergency Department, [address] [facility], [place], [place], [facility]          Patient's Medications   Discharge Prescriptions    DICYCLOMINE (BENTYL) 20 MG TABLET    Take 1 tablet (20 mg total) by mouth 2 (two) times a day       Start Date: 5/16/2023 End Date: --       Order Dose: 20 mg       Quantity: 20 tablet    Refills: 0    ONDANSETRON (ZOFRAN-ODT) 4 MG DISINTEGRATING TABLET    Take 1 tablet (4 mg total) by mouth every 6 (six) hours as needed for nausea or vomiting       Start Date: 5/16/2023 End Date: --       Order Dose: 4 mg       Quantity: 20 tablet    Refills: 0       No discharge procedures on file      PDMP Review     None          ED Provider  Electronically Signed by           Alisha Lira DO  05/16/23 1355

## 2023-05-22 ENCOUNTER — OFFICE VISIT (OUTPATIENT)
Dept: OBGYN CLINIC | Facility: CLINIC | Age: 38
End: 2023-05-22

## 2023-05-22 VITALS
HEART RATE: 88 BPM | DIASTOLIC BLOOD PRESSURE: 95 MMHG | HEIGHT: 74 IN | BODY MASS INDEX: 33.11 KG/M2 | SYSTOLIC BLOOD PRESSURE: 147 MMHG | WEIGHT: 258 LBS

## 2023-05-22 DIAGNOSIS — G57.01 PIRIFORMIS SYNDROME OF RIGHT SIDE: ICD-10-CM

## 2023-05-22 DIAGNOSIS — M54.16 RADICULOPATHY, LUMBAR REGION: Primary | ICD-10-CM

## 2023-05-22 DIAGNOSIS — R29.898 WEAKNESS OF BOTH HIPS: ICD-10-CM

## 2023-05-22 DIAGNOSIS — R19.8 ABDOMINAL WEAKNESS: ICD-10-CM

## 2023-05-22 DIAGNOSIS — S39.012A LUMBAR STRAIN, INITIAL ENCOUNTER: ICD-10-CM

## 2023-05-22 DIAGNOSIS — M62.830 LUMBAR PARASPINAL MUSCLE SPASM: ICD-10-CM

## 2023-05-22 RX ORDER — DICLOFENAC SODIUM 75 MG/1
75 TABLET, DELAYED RELEASE ORAL 2 TIMES DAILY
Qty: 60 TABLET | Refills: 0 | Status: SHIPPED | OUTPATIENT
Start: 2023-05-22

## 2023-05-22 RX ORDER — GABAPENTIN 300 MG/1
300 CAPSULE ORAL
Qty: 30 CAPSULE | Refills: 0 | Status: SHIPPED | OUTPATIENT
Start: 2023-05-22

## 2023-05-22 RX ORDER — DULAGLUTIDE 1.5 MG/.5ML
INJECTION, SOLUTION SUBCUTANEOUS
COMMUNITY
Start: 2023-03-07

## 2023-05-22 NOTE — PROGRESS NOTES
Assessment/Plan:  Assessment/Plan   Diagnoses and all orders for this visit:    Radiculopathy, lumbar region  -     Ambulatory Referral to Physical Therapy; Future  -     diclofenac (VOLTAREN) 75 mg EC tablet; Take 1 tablet (75 mg total) by mouth 2 (two) times a day  -     gabapentin (Neurontin) 300 mg capsule; Take 1 capsule (300 mg total) by mouth daily at bedtime    Piriformis syndrome of right side  -     Ambulatory Referral to Physical Therapy; Future    Lumbar strain, initial encounter  -     Ambulatory Referral to Physical Therapy; Future  -     diclofenac (VOLTAREN) 75 mg EC tablet; Take 1 tablet (75 mg total) by mouth 2 (two) times a day    Lumbar paraspinal muscle spasm  -     Ambulatory Referral to Physical Therapy; Future    Weakness of both hips  -     Ambulatory Referral to Physical Therapy; Future    Abdominal weakness  -     Ambulatory Referral to Physical Therapy; Future    BMI 33 0-33 9,adult  -     Ambulatory Referral to Weight Management; Future    Other orders  -     Trulicity 1 5 GA/0 1EA injection; INJECT 1 5 MG UNDER THE SKIN EVERY 7 DAYS  29-year-old male with low back and right lower extremity numbness and tingling more than 3 months duration  Discussed with patient physical exam, imaging studies, impression, and plan  X-rays lumbar spine are unremarkable for osseous abnormality or significant degenerative changes  Physical exam of lumbar spine is unremarkable for midline or paraspinal tenderness  He has limited range of motion with rotating to both sides  Right hip noted for tenderness at the piriformis  There is no groin pain with DOROTHY and FADDIR of the hips, however limited range of motion with passive internal rotation  Straight leg raise is unremarkable bilaterally  He has decreased patellar reflex bilaterally  He has normal sensation both lower extremities    Clinical impression is that he has symptoms recommendation of piriformis syndrome and lumbar spine pathology causing nerve impingement/irritation and symptoms to radiate to the right lower extremity  I discussed treatment regimen of anti-inflammatory, nerve modulating medication, supplements, and formal therapy  Invasive procedures such as injection and surgery not warranted at this time  He is to take diclofenac 75 mg twice daily with food for 30 days and during that time not to take ibuprofen or Aleve, but may take Tylenol  He is to take gabapentin 300 mg at night  He is to start taking turmeric at least 1000 mg daily and tart cherry at least 1000 mg daily  I will refer him to physical therapy which he is to start as soon as possible and do home exercises as directed  Also counseled patient that most recent A1c is 10 9 and this can contribute to neuropathy  I will refer him to weight management for nutrition recommendations  He will follow-up with me if no improvement after 5 to 6 weeks of formal therapy  Subjective:   Patient ID: Maura Retana  is a 41 y o  male  Chief Complaint   Patient presents with   • Lower Back - Pain       82-year-old male presents for evaluation of low back and right lower extremity numbness and tingling more than 3 months duration  He denies trauma or inciting event  Pain described as gradual in onset well localized to the right low back/buttock region, radiating distally to the right lower leg, associated with tingling in right lower leg, worse with prolonged standing and ambulation, and improved with resting  Symptoms have worsened in the past few months  He was given prescription for diclofenac however did not take it  He presented to the emergency room and x-ray evaluation of lumbar spine was unremarkable  He was prescribed methocarbamol and referred to orthopedic care  He takes muscle relaxer intermittently  He denies bowel or bladder incontinence  Back Pain  The current episode started more than 1 month ago  The problem occurs daily   The problem has been "waxing and waning  Associated symptoms include numbness  Pertinent negatives include no abdominal pain, arthralgias, chest pain, chills, fever, joint swelling, rash, sore throat or weakness  The symptoms are aggravated by standing and walking  He has tried position changes and NSAIDs (Muscle relaxer) for the symptoms  The treatment provided mild relief  The following portions of the patient's history were reviewed and updated as appropriate: He  has a past medical history of Diabetes mellitus ([address] )  He has No Known Allergies       Review of Systems   Constitutional: Negative for chills and fever  HENT: Negative for sore throat  Eyes: Negative for visual disturbance  Respiratory: Negative for shortness of breath  Cardiovascular: Negative for chest pain  Gastrointestinal: Negative for abdominal pain  Genitourinary: Negative for flank pain  Musculoskeletal: Positive for back pain  Negative for arthralgias and joint swelling  Skin: Negative for rash and wound  Neurological: Positive for numbness  Negative for weakness  Hematological: Does not bruise/bleed easily  Psychiatric/Behavioral: Negative for self-injury  Objective:  Vitals:    05/22/23 0759   BP: 147/95   Pulse: 88   Weight: 117 kg (258 lb)   Height: 6' 2\" (1 88 m)     Right Ankle Exam     Muscle Strength   Dorsiflexion:  5/5  Plantar flexion:  5/5      Left Ankle Exam     Muscle Strength   Dorsiflexion:  5/5   Plantar flexion:  5/5       Right Hip Exam     Muscle Strength   Abduction: 4/5   Flexion: 4/5     Tests   DOROTHY: negative    Comments:  Negative FADDIR      Left Hip Exam     Muscle Strength   Abduction: 4/5   Flexion: 4/5     Tests   DOROTHY: negative    Comments:  Negative FADDIR      Back Exam     Tenderness   The patient is experiencing no tenderness       Range of Motion   Extension: normal   Flexion: normal   Lateral bend right: normal   Lateral bend left: normal   Rotation right: abnormal   Rotation left: " abnormal     Muscle Strength   Right Quadriceps:  5/5   Left Quadriceps:  5/5     Tests   Straight leg raise right: negative  Straight leg raise left: negative    Other   Sensation: normal  Gait: normal           Strength/Myotome Testing     Left Ankle/Foot   Dorsiflexion: 5  Plantar flexion: 5    Right Ankle/Foot   Dorsiflexion: 5  Plantar flexion: 5      Physical Exam  Vitals and nursing note reviewed  Constitutional:       General: He is not in acute distress  Appearance: He is well-developed  He is not ill-appearing or diaphoretic  HENT:      Head: Normocephalic and atraumatic  Right Ear: External ear normal       Left Ear: External ear normal    Eyes:      Conjunctiva/sclera: Conjunctivae normal    Neck:      Trachea: No tracheal deviation  Cardiovascular:      Rate and Rhythm: Normal rate  Pulmonary:      Effort: Pulmonary effort is normal  No respiratory distress  Abdominal:      General: There is no distension  Musculoskeletal:         General: Tenderness present  No swelling, deformity or signs of injury  Lumbar back: Negative right straight leg raise test and negative left straight leg raise test    Skin:     General: Skin is warm and dry  Coloration: Skin is not jaundiced or pale  Neurological:      Mental Status: He is alert and oriented to person, place, and time  Psychiatric:         Mood and Affect: Mood normal          Behavior: Behavior normal          Thought Content: Thought content normal          Judgment: Judgment normal          I have personally reviewed pertinent films in PACS and my interpretation is No acute osseous abnormality or significant degenerative change lumbar spine  Tranexamic Acid Pregnancy And Lactation Text: It is unknown if this medication is safe during pregnancy or breast feeding.

## 2024-05-15 ENCOUNTER — APPOINTMENT (EMERGENCY)
Dept: RADIOLOGY | Facility: HOSPITAL | Age: 39
End: 2024-05-15
Payer: COMMERCIAL

## 2024-05-15 ENCOUNTER — HOSPITAL ENCOUNTER (EMERGENCY)
Facility: HOSPITAL | Age: 39
Discharge: HOME | End: 2024-05-15
Attending: EMERGENCY MEDICINE
Payer: COMMERCIAL

## 2024-05-15 ENCOUNTER — APPOINTMENT (EMERGENCY)
Dept: CT IMAGING | Facility: HOSPITAL | Age: 39
End: 2024-05-15
Payer: COMMERCIAL

## 2024-05-15 VITALS
DIASTOLIC BLOOD PRESSURE: 92 MMHG | HEART RATE: 83 BPM | OXYGEN SATURATION: 98 % | BODY MASS INDEX: 33.11 KG/M2 | TEMPERATURE: 98 F | RESPIRATION RATE: 18 BRPM | HEIGHT: 74 IN | WEIGHT: 258 LBS | SYSTOLIC BLOOD PRESSURE: 138 MMHG

## 2024-05-15 DIAGNOSIS — M79.605 LEFT LEG PAIN: ICD-10-CM

## 2024-05-15 DIAGNOSIS — S09.90XA CLOSED HEAD INJURY, INITIAL ENCOUNTER: ICD-10-CM
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
## 2024-05-15 DIAGNOSIS — M54.9 BACK PAIN: ICD-10-CM

## 2024-05-15 DIAGNOSIS — S16.1XXA CERVICAL STRAIN: ICD-10-CM

## 2024-05-15 DIAGNOSIS — M25.551 RIGHT HIP PAIN: ICD-10-CM

## 2024-05-15 PROCEDURE — 99284 EMERGENCY DEPT VISIT MOD MDM: CPT

## 2024-05-15 PROCEDURE — 99284 EMERGENCY DEPT VISIT MOD MDM: CPT | Performed by: EMERGENCY MEDICINE

## 2024-05-15 PROCEDURE — 73552 X-RAY EXAM OF FEMUR 2/>: CPT

## 2024-05-15 PROCEDURE — 70450 CT HEAD/BRAIN W/O DYE: CPT

## 2024-05-15 PROCEDURE — 72125 CT NECK SPINE W/O DYE: CPT

## 2024-05-15 PROCEDURE — 96372 THER/PROPH/DIAG INJ SC/IM: CPT

## 2024-05-15 PROCEDURE — 72170 X-RAY EXAM OF PELVIS: CPT

## 2024-05-15 RX ORDER — KETOROLAC TROMETHAMINE 30 MG/ML
15 INJECTION, SOLUTION INTRAMUSCULAR; INTRAVENOUS ONCE
Status: COMPLETED | OUTPATIENT
Start: 2024-05-15 | End: 2024-05-15

## 2024-05-15 RX ADMIN — KETOROLAC TROMETHAMINE 15 MG: 30 INJECTION, SOLUTION INTRAMUSCULAR; INTRAVENOUS at 17:56

## 2024-05-15 NOTE — Clinical Note
Munir Mckeon was seen and treated in our emergency department on 5/15/2024.                Diagnosis:     Munir  may return to work on return date.    He may return on this date: 05/18/2024         If you have any questions or concerns, please don't hesitate to call.      Kassandra Larson RN    ______________________________           _______________          _______________  Hospital Representative                              Date                                Time
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
## 2024-09-09 ENCOUNTER — APPOINTMENT (OUTPATIENT)
Dept: PHYSICAL THERAPY | Facility: MEDICAL CENTER | Age: 39
End: 2024-09-09

## 2024-09-09 PROCEDURE — 97530 THERAPEUTIC ACTIVITIES: CPT

## 2024-11-19 ENCOUNTER — APPOINTMENT (EMERGENCY)
Dept: RADIOLOGY | Facility: HOSPITAL | Age: 39
End: 2024-11-19
Payer: OTHER MISCELLANEOUS

## 2024-11-19 ENCOUNTER — HOSPITAL ENCOUNTER (EMERGENCY)
Facility: HOSPITAL | Age: 39
Discharge: HOME | End: 2024-11-19
Attending: EMERGENCY MEDICINE
Payer: OTHER MISCELLANEOUS

## 2024-11-19 VITALS
TEMPERATURE: 98 F | SYSTOLIC BLOOD PRESSURE: 149 MMHG | RESPIRATION RATE: 18 BRPM | HEART RATE: 95 BPM | OXYGEN SATURATION: 99 % | DIASTOLIC BLOOD PRESSURE: 90 MMHG

## 2024-11-19 DIAGNOSIS — S69.91XA INJURY OF FINGER OF RIGHT HAND, INITIAL ENCOUNTER: Primary | ICD-10-CM

## 2024-11-19 PROCEDURE — 99284 EMERGENCY DEPT VISIT MOD MDM: CPT | Performed by: EMERGENCY MEDICINE

## 2024-11-19 PROCEDURE — 99283 EMERGENCY DEPT VISIT LOW MDM: CPT

## 2024-11-19 PROCEDURE — 73140 X-RAY EXAM OF FINGER(S): CPT

## 2024-11-19 NOTE — Clinical Note
Munir Mckeon was seen and treated in our emergency department on 11/19/2024.                Diagnosis: finger injury    Munir  .    He may return on this date: 11/24/2024         If you have any questions or concerns, please don't hesitate to call.      Thalia Marinelli MD    ______________________________           _______________          _______________  Hospital Representative                              Date                                Time

## 2024-11-20 NOTE — ED PROVIDER NOTES
Time reflects when diagnosis was documented in both MDM as applicable and the Disposition within this note       Time User Action Codes Description Comment    11/19/2024  9:33 PM MarinelliThalia Add [S69.91XA] Injury of finger of right hand, initial encounter           ED Disposition       ED Disposition   Discharge    Condition   Stable    Date/Time   Tue Nov 19, 2024  9:36 PM    Comment   Munir Mckeon Jr. discharge to home/self care.                   Assessment & Plan       Medical Decision Making       XR shows no acute fracture or dislocation per my read. Have given aluminum finger splint and will refer to orthopedics as needed.    Medications - No data to display    ED Risk Strat Scores                           SBIRT 22yo+      Flowsheet Row Most Recent Value   Initial Alcohol Screen: US AUDIT-C     1. How often do you have a drink containing alcohol? 0 Filed at: 11/19/2024 2102   2. How many drinks containing alcohol do you have on a typical day you are drinking?  0 Filed at: 11/19/2024 2102   3a. Male UNDER 65: How often do you have five or more drinks on one occasion? 0 Filed at: 11/19/2024 2102   3b. FEMALE Any Age, or MALE 65+: How often do you have 4 or more drinks on one occassion? 0 Filed at: 11/19/2024 2102   Audit-C Score 0 Filed at: 11/19/2024 2102   KALEN: How many times in the past year have you...    Used an illegal drug or used a prescription medication for non-medical reasons? Never Filed at: 11/19/2024 2102                            History of Present Illness       Chief Complaint   Patient presents with    Finger Injury     R middle finger injury at work        Past Medical History:   Diagnosis Date    Diabetes mellitus (HCC)       No past surgical history on file.   No family history on file.   Social History     Tobacco Use    Smoking status: Every Day     Current packs/day: 0.25     Types: Cigarettes    Smokeless tobacco: Never   Vaping Use    Vaping status: Never Used   Substance Use  Topics    Drug use: Yes     Types: Marijuana      E-Cigarette/Vaping    E-Cigarette Use Never User       E-Cigarette/Vaping Substances      I have reviewed and agree with the history as documented.     HPI  38 yo M RHD presents with R middle finger injury. He states that the food tray return fell on his finger just prior to arrival, causing pain. Able to bend finger but with pain. No other injuries.   Review of Systems   Constitutional:  Negative for chills and fever.   HENT:  Negative for dental problem and ear pain.    Eyes:  Negative for pain and redness.   Respiratory:  Negative for cough and shortness of breath.    Cardiovascular:  Negative for chest pain and palpitations.   Gastrointestinal:  Negative for abdominal pain and nausea.   Endocrine: Negative for polydipsia and polyphagia.   Genitourinary:  Negative for dysuria and frequency.   Musculoskeletal:  Positive for arthralgias. Negative for joint swelling.   Skin:  Negative for color change and rash.   Neurological:  Negative for dizziness and headaches.   Psychiatric/Behavioral:  Negative for behavioral problems and confusion.    All other systems reviewed and are negative.          Objective       ED Triage Vitals   Temperature Pulse Blood Pressure Respirations SpO2 Patient Position - Orthostatic VS   11/19/24 2102 11/19/24 2102 11/19/24 2102 11/19/24 2102 11/19/24 2102 --   98 °F (36.7 °C) 95 149/90 18 99 %       Temp src Heart Rate Source BP Location FiO2 (%) Pain Score    -- -- -- -- 11/19/24 2107        6      Vitals      Date and Time Temp Pulse SpO2 Resp BP Pain Score FACES Pain Rating User   11/19/24 2107 -- -- -- -- -- 6 -- CC   11/19/24 2102 98 °F (36.7 °C) 95 99 % 18 149/90 -- -- AR            Physical Exam  Vitals and nursing note reviewed.   Constitutional:       General: He is not in acute distress.  HENT:      Head: Normocephalic and atraumatic.      Right Ear: External ear normal.      Left Ear: External ear normal.      Nose: Nose normal.    Eyes:      General: No scleral icterus.  Cardiovascular:      Rate and Rhythm: Normal rate.   Pulmonary:      Effort: Pulmonary effort is normal. No respiratory distress.   Abdominal:      General: There is no distension.   Musculoskeletal:         General: No deformity. Normal range of motion.      Comments: R middle finger with mild TTP distal aspect, normal ROM, normal strength and sensation, radial pulse 2+   Skin:     Findings: No rash.   Neurological:      General: No focal deficit present.      Mental Status: He is alert.      Gait: Gait normal.   Psychiatric:         Mood and Affect: Mood normal.         Results Reviewed       None            XR finger third digit-middle RIGHT    (Results Pending)       Splint application    Date/Time: 11/19/2024 10:07 PM    Performed by: Thalia Marinelli MD  Authorized by: Thalia Marinelli MD  Universal Protocol:  Consent given by: patient  Patient identity confirmed: verbally with patient and arm band    Pre-procedure details:     Sensation:  Normal  Procedure details:     Laterality:  Right    Location:  Hand    Hand:  R hand    Splint type:  Finger splint, static    Supplies:  Aluminum splint      ED Medication and Procedure Management   Prior to Admission Medications   Prescriptions Last Dose Informant Patient Reported? Taking?   Trulicity 1.5 MG/0.5ML injection  Self Yes No   Sig: INJECT 1.5 MG UNDER THE SKIN EVERY 7 DAYS.   atorvastatin (LIPITOR) 10 mg tablet  Self Yes No   Sig: Take 10 mg by mouth daily   diclofenac (VOLTAREN) 75 mg EC tablet  Self No No   Sig: Take 1 tablet (75 mg total) by mouth 2 (two) times a day   diclofenac (VOLTAREN) 75 mg EC tablet   No No   Sig: Take 1 tablet (75 mg total) by mouth 2 (two) times a day   dicyclomine (BENTYL) 20 mg tablet  Self No No   Sig: Take 1 tablet (20 mg total) by mouth 2 (two) times a day   gabapentin (Neurontin) 300 mg capsule   No No   Sig: Take 1 capsule (300 mg total) by mouth daily at bedtime   lidocaine (LIDODERM)  5 %  Self No No   Sig: Apply 1 patch topically daily Remove & Discard patch within 12 hours or as directed by MD   lisinopril (ZESTRIL) 20 mg tablet  Self Yes No   Sig: Take 20 mg by mouth daily   metFORMIN (GLUCOPHAGE) 1000 MG tablet  Self Yes No   Sig: Take 1,000 mg by mouth   methocarbamol (ROBAXIN) 750 mg tablet  Self No No   Sig: Take 1 tablet (750 mg total) by mouth 2 (two) times a day as needed for muscle spasms   ondansetron (ZOFRAN-ODT) 4 mg disintegrating tablet  Self No No   Sig: Take 1 tablet (4 mg total) by mouth every 6 (six) hours as needed for nausea or vomiting      Facility-Administered Medications: None     Discharge Medication List as of 11/19/2024  9:36 PM        CONTINUE these medications which have NOT CHANGED    Details   atorvastatin (LIPITOR) 10 mg tablet Take 10 mg by mouth daily, Historical Med      !! diclofenac (VOLTAREN) 75 mg EC tablet Take 1 tablet (75 mg total) by mouth 2 (two) times a day, Starting Sun 8/14/2022, Normal      !! diclofenac (VOLTAREN) 75 mg EC tablet Take 1 tablet (75 mg total) by mouth 2 (two) times a day, Starting Mon 5/22/2023, Normal      dicyclomine (BENTYL) 20 mg tablet Take 1 tablet (20 mg total) by mouth 2 (two) times a day, Starting Tue 5/16/2023, Print      gabapentin (Neurontin) 300 mg capsule Take 1 capsule (300 mg total) by mouth daily at bedtime, Starting Mon 5/22/2023, Normal      lidocaine (LIDODERM) 5 % Apply 1 patch topically daily Remove & Discard patch within 12 hours or as directed by MD, Starting Sun 8/14/2022, Normal      lisinopril (ZESTRIL) 20 mg tablet Take 20 mg by mouth daily, Historical Med      metFORMIN (GLUCOPHAGE) 1000 MG tablet Take 1,000 mg by mouth, Historical Med      methocarbamol (ROBAXIN) 750 mg tablet Take 1 tablet (750 mg total) by mouth 2 (two) times a day as needed for muscle spasms, Starting Sat 4/29/2023, Normal      ondansetron (ZOFRAN-ODT) 4 mg disintegrating tablet Take 1 tablet (4 mg total) by mouth every 6 (six)  hours as needed for nausea or vomiting, Starting Tue 5/16/2023, Print      Trulicity 1.5 MG/0.5ML injection INJECT 1.5 MG UNDER THE SKIN EVERY 7 DAYS., Historical Med       !! - Potential duplicate medications found. Please discuss with provider.        No discharge procedures on file.  ED SEPSIS DOCUMENTATION   Time reflects when diagnosis was documented in both MDM as applicable and the Disposition within this note       Time User Action Codes Description Comment    11/19/2024  9:33 PM Thalia Marinelli Add [S69.91XA] Injury of finger of right hand, initial encounter                  Thalia Marinelli MD  11/19/24 5424